# Patient Record
Sex: MALE | Race: WHITE | Employment: FULL TIME | URBAN - METROPOLITAN AREA
[De-identification: names, ages, dates, MRNs, and addresses within clinical notes are randomized per-mention and may not be internally consistent; named-entity substitution may affect disease eponyms.]

---

## 2019-07-03 ENCOUNTER — OFFICE VISIT (OUTPATIENT)
Dept: URGENT CARE | Facility: URGENT CARE | Age: 54
End: 2019-07-03
Payer: COMMERCIAL

## 2019-07-03 ENCOUNTER — ANCILLARY PROCEDURE (OUTPATIENT)
Dept: GENERAL RADIOLOGY | Facility: CLINIC | Age: 54
End: 2019-07-03
Attending: PHYSICIAN ASSISTANT
Payer: COMMERCIAL

## 2019-07-03 VITALS
SYSTOLIC BLOOD PRESSURE: 106 MMHG | OXYGEN SATURATION: 97 % | TEMPERATURE: 98.8 F | WEIGHT: 203 LBS | BODY MASS INDEX: 27.5 KG/M2 | HEIGHT: 72 IN | HEART RATE: 68 BPM | DIASTOLIC BLOOD PRESSURE: 78 MMHG

## 2019-07-03 DIAGNOSIS — M79.644 PAIN OF RIGHT MIDDLE FINGER: ICD-10-CM

## 2019-07-03 DIAGNOSIS — S63.619A SPRAIN OF FINGER OF RIGHT HAND, INITIAL ENCOUNTER: Primary | ICD-10-CM

## 2019-07-03 PROCEDURE — 99213 OFFICE O/P EST LOW 20 MIN: CPT | Performed by: PHYSICIAN ASSISTANT

## 2019-07-03 PROCEDURE — 73140 X-RAY EXAM OF FINGER(S): CPT | Mod: RT

## 2019-07-03 RX ORDER — PHENAZOPYRIDINE HYDROCHLORIDE 95 MG/1
190 TABLET ORAL 3 TIMES DAILY
COMMUNITY

## 2019-07-03 ASSESSMENT — MIFFLIN-ST. JEOR: SCORE: 1793.8

## 2019-07-03 NOTE — PATIENT INSTRUCTIONS
Your xrays were negative for fracture by my read. We will call you if the radiologist finds anything on their read overnight.  Most likely this is a finger sprain- a stretch to the ligaments on either side of the joint.  Recommend wearing your finger splint for 1-2 weeks as needed until pain and swelling are decreased.  May remove to do icing and simple range of motion exercises.  Follow up with your doctor if no improvement in 2 weeks, sooner if worsening.

## 2019-07-03 NOTE — PROGRESS NOTES
"SUBJECTIVE:   Donny Reed is a 54 year old male presenting for evaluation of   Chief Complaint   Patient presents with     Urgent Care     Finger     Injured right 3rd finger yesterday, finger is reddened and slightly swollen.  Would like checked;l did buy finger splint if it may be helpful.   .  Was pushing some bedding yesterday at 4pm and his right 3rd finger at the DIP joint is swollen. States the finger \"bent sideways a little bit.\" He has mild pain. He can still bend and move the finger.   He did buy his own finger splint and has not worn it yet. He is right hand dominant.  No fever. No tingling in finger.      ROS  See HPI    PMH:  Past Medical History:   Diagnosis Date     Allergic rhinitis, cause unspecified     Allergic rhinitis     MEDICAL HISTORY OF -     CONGENITAL ABSENCE OF LT SUBCLAVIAN ARTERY     Patient Active Problem List    Diagnosis Date Noted     Allergic rhinitis 06/28/2005     Priority: Medium     Allergic rhinitis  Problem list name updated by automated process. Provider to review           Current medications:  Current Outpatient Medications   Medication Sig Dispense Refill     phenazopyridine (AZO URINARY PAIN RELIEF) 95 MG tablet Take 190 mg by mouth 3 times daily       CLARITIN 10 MG OR TABS PRN       GUAIFENESIN-CODEINE 100-10 MG/5ML PO SOLN 1 - 2 TEASPOONS EVERY 4 HOURS AS NEEDED (Patient not taking: No sig reported) 180 mL 0       Surgical History:  No past surgical history on file.    Family history:  Family History   Problem Relation Age of Onset     C.A.D. Paternal Grandmother      C.A.D. Paternal Grandfather      Cerebrovascular Disease Paternal Grandmother      Breast Cancer Maternal Grandmother          Social History:  Social History     Tobacco Use     Smoking status: Never Smoker     Smokeless tobacco: Never Used   Substance Use Topics     Alcohol use: Yes     Comment: OCC/ ABOUT 2-3/WEEK           OBJECTIVE  /78   Pulse 68   Temp 98.8  F (37.1  C) (Oral)   Ht " 1.829 m (6')   Wt 92.1 kg (203 lb)   SpO2 97%   BMI 27.53 kg/m      Physical Exam  General: well-appearing, no acute distress.   Muscloloskeletal: right hand: there is very mild edema and mild tenderness at the middle finger DIP joint. Full ROM at DIP and PIP joints. Full DIP and PIP strength for flexion and extension.  Neuro: Alert. Sensation to light touch intact in right hand.  Psych: Normal mood and affect.          Labs:  No results found for this or any previous visit (from the past 24 hour(s)).    X-Ray was done, my findings are: no fracture        ASSESSMENT/PLAN:      ICD-10-CM    1. Sprain of finger of right hand, initial encounter S63.619A    2. Pain of right middle finger M79.644 XR Finger Right G/E 2 Views        Medical Decision Making:    Serious Comorbid Conditions: none    Differential Diagnosis:   Avulsion fracture of middle finger (ulnar or radial collateral ligament of DIP joint?)  Ligament sprain    Xrays showed no fracture per my read. Await radiology read.    Discussed most likely diagnosis of sprain of ligament of finger. Recommend immobilization x 2 weeks. He has his own finger splint which he will use.  Follow up with PCP if no improvement in 2 weeks.      At the end of the encounter, I discussed all available results with patient. Discussed diagnosis and treatment plan.   Return precautions provided to patient and printed below in patient instructions.  Patient understood and agreed to plan. Patient was appropriate for discharge.        Patient Instructions   Your xrays were negative for fracture by my read. We will call you if the radiologist finds anything on their read overnight.  Most likely this is a finger sprain- a stretch to the ligaments on either side of the joint.  Recommend wearing your finger splint for 1-2 weeks as needed until pain and swelling are decreased.  May remove to do icing and simple range of motion exercises.  Follow up with your doctor if no improvement in 2  weeks, sooner if worsening.            Grecia Browning PA-C  07/03/19 6:20 PM